# Patient Record
Sex: FEMALE | Race: WHITE | NOT HISPANIC OR LATINO | Employment: FULL TIME | URBAN - METROPOLITAN AREA
[De-identification: names, ages, dates, MRNs, and addresses within clinical notes are randomized per-mention and may not be internally consistent; named-entity substitution may affect disease eponyms.]

---

## 2019-03-12 ENCOUNTER — OFFICE VISIT (OUTPATIENT)
Dept: GASTROENTEROLOGY | Facility: CLINIC | Age: 59
End: 2019-03-12

## 2019-03-12 VITALS — HEIGHT: 64 IN | WEIGHT: 170 LBS | BODY MASS INDEX: 29.02 KG/M2

## 2019-03-12 DIAGNOSIS — Z83.71 FHX: COLONIC POLYPS: Primary | ICD-10-CM

## 2019-03-12 RX ORDER — LEVOTHYROXINE SODIUM 0.03 MG/1
25 TABLET ORAL DAILY
COMMUNITY

## 2019-03-12 NOTE — PROGRESS NOTES
Pt seen for colon prep; meds reviewed; suprep instructions given and reivewed; rx forwarded to provider for approval; screening form signed

## 2022-09-19 ENCOUNTER — TELEPHONE (OUTPATIENT)
Dept: GASTROENTEROLOGY | Facility: CLINIC | Age: 62
End: 2022-09-19

## 2022-09-19 ENCOUNTER — OFFICE VISIT (OUTPATIENT)
Dept: GASTROENTEROLOGY | Facility: CLINIC | Age: 62
End: 2022-09-19
Payer: COMMERCIAL

## 2022-09-19 VITALS
HEART RATE: 73 BPM | BODY MASS INDEX: 36.19 KG/M2 | SYSTOLIC BLOOD PRESSURE: 124 MMHG | HEIGHT: 64 IN | WEIGHT: 212 LBS | DIASTOLIC BLOOD PRESSURE: 78 MMHG

## 2022-09-19 DIAGNOSIS — R11.14 BILIOUS VOMITING WITH NAUSEA: Primary | ICD-10-CM

## 2022-09-19 DIAGNOSIS — K59.01 SLOW TRANSIT CONSTIPATION: ICD-10-CM

## 2022-09-19 DIAGNOSIS — Z83.71 FAMILY HISTORY OF COLONIC POLYPS: ICD-10-CM

## 2022-09-19 PROBLEM — Z99.89 OSA ON CPAP: Status: ACTIVE | Noted: 2022-09-19

## 2022-09-19 PROBLEM — E66.9 OBESITY (BMI 35.0-39.9 WITHOUT COMORBIDITY): Status: ACTIVE | Noted: 2022-09-19

## 2022-09-19 PROBLEM — G47.33 OSA ON CPAP: Status: ACTIVE | Noted: 2022-09-19

## 2022-09-19 PROCEDURE — 99204 OFFICE O/P NEW MOD 45 MIN: CPT | Performed by: INTERNAL MEDICINE

## 2022-09-19 RX ORDER — MIRABEGRON 25 MG/1
25 TABLET, FILM COATED, EXTENDED RELEASE ORAL DAILY
COMMUNITY
Start: 2022-08-29

## 2022-09-19 RX ORDER — FAMOTIDINE 20 MG/1
20 TABLET, FILM COATED ORAL 2 TIMES DAILY PRN
Qty: 60 TABLET | Refills: 5 | Status: SHIPPED | OUTPATIENT
Start: 2022-09-19

## 2022-09-19 NOTE — PROGRESS NOTES
[id number] - Outpatient Consultation  Eladio Gomez 63 y o  female MRN: [id number]  Encounter: [id number]    ASSESSMENT AND PLAN:      1  Bilious vomiting with nausea  This is a 61F referred to us by Dr Mila Murphy MD for intermittent episodes of nausea and vomiting and heartburn  Sounds like reflux triggered by over eating, recent 20+lb weight gain, and seasonal allergies  - GERD lifestyle modifications and weight loss discussed  - Pepcid prn ok until EGD    - Schedule EGD @ [address]   - famotidine (PEPCID) 20 mg tablet; Take 1 tablet (20 mg total) by mouth 2 (two) times a day as needed for heartburn  Dispense: 60 tablet; Refill: 5    2  Slow transit constipation  Well controlled benefiber daily  Increase water intake  3  Family history of colonic polyps  Brother w polyps  Last colon neg in 2019, recall 3/2024  Followup Appointment: 3 months  ______________________________________________________________________    Chief Complaint   Patient presents with    Indigestion, vomiting, change in bowels on and off     Referred by Dr Goldie Quinones         HPI:   Eladio Gomez is a 63y o  year old female who presents today at the request of her PCP for n/v/heartburn  Pt states she gets these intermittent episodes of symptoms  Started about 2 years ago  Has gained about 25lbs in the past 3 years  Episodes are sporadic  Can last several hours  Worse in the evenings after her largest meal of the day  Epigastric burning and post prandial nausea and regurgitating of bile or sometimes food  Worse a large meals  No f/c  Worse during allergy seasons when she is congested  Tried allergy meds       Historical Information   Past Medical History:   Diagnosis Date    Allergic rhinitis     Colon polyp     Dermatofibroma 2009    Hypertonicity of bladder     Hypothyroidism     Knee pain, right     Obesity (BMI 35 0-39 9 without comorbidity)     BRUCE on CPAP      Past Surgical History:   Procedure Laterality Date    CHOLECYSTECTOMY      COLONOSCOPY      LEG SURGERY Right     plates and screws     Social History     Substance and Sexual Activity   Alcohol Use Yes    Comment: occasional     Social History     Substance and Sexual Activity   Drug Use Not on file     Social History     Tobacco Use   Smoking Status Never Smoker   Smokeless Tobacco Never Used     Family History   Problem Relation Age of Onset    Cancer Mother     Stomach cancer Sister     Colon polyps Brother     Cancer Brother     Colon cancer Neg Hx        Meds/Allergies     Current Outpatient Medications:     famotidine (PEPCID) 20 mg tablet    levothyroxine 25 mcg tablet    Myrbetriq 25 MG TB24    Wheat Dextrin (BENEFIBER PO)    No Known Allergies    PHYSICAL EXAM:    Blood pressure 124/78, pulse 73, height 5' 4" (1 626 m), weight 96 2 kg (212 lb)  Body mass index is 36 39 kg/m²  General Appearance: NAD, cooperative, alert  Eyes: Anicteric, PERRLA, EOMI  ENT:  Normocephalic, atraumatic, normal mucosa  Neck:  Supple, symmetrical, trachea midline,   Resp:  Clear to auscultation bilaterally; no rales, rhonchi or wheezing; respirations unlabored   CV:  S1 S2, Regular rate and rhythm; no murmur, rub, or gallop  GI:  Soft, non-tender, non-distended; normal bowel sounds; no masses, no organomegaly   Rectal: Deferred  Musculoskeletal: No cyanosis, clubbing or edema  Normal ROM  Skin:  No jaundice, rashes, or lesions   Heme/Lymph: No palpable cervical lymphadenopathy  Psych: Normal affect, good eye contact  Neuro: No gross deficits, AAOx3    Lab Results:   Labs from 5/2022 reviewed  Normal CBC and CMP  REVIEW OF SYSTEMS:    CONSTITUTIONAL: Denies any fever, chills, rigors, and weight loss  HEENT: No earache or tinnitus  Denies hearing loss or visual disturbances  CARDIOVASCULAR: No chest pain or palpitations     RESPIRATORY: Denies any cough, hemoptysis, shortness of breath or dyspnea on exertion  GASTROINTESTINAL: As noted in the History of Present Illness  GENITOURINARY: No problems with urination  Denies any hematuria or dysuria  NEUROLOGIC: No dizziness or vertigo, denies headaches  MUSCULOSKELETAL: Denies any muscle or joint pain  SKIN: Denies skin rashes or itching  ENDOCRINE: Denies excessive thirst  Denies intolerance to heat or cold  PSYCHOSOCIAL: Denies depression or anxiety  Denies any recent memory loss

## 2022-09-19 NOTE — TELEPHONE ENCOUNTER
Scheduled date of EGD(as of today):09/26/2022  Physician performing EGD:Dr Halle Bates  Location of EGD:[facility]  Instructions reviewed with patient by:Cindy Marks  Clearances: no

## 2022-09-19 NOTE — TELEPHONE ENCOUNTER
Patient was seen on 09/19/2022 and an EGD was ordered  She is scheduled on 09/26/2022, with Dr Aniyah Panda, at the [facility]

## 2022-09-19 NOTE — H&P (VIEW-ONLY)
[id number] - Outpatient Consultation  Josue Conley 63 y o  female MRN: [id number]  Encounter: [id number]    ASSESSMENT AND PLAN:      1  Bilious vomiting with nausea  This is a 61F referred to us by Dr Corin Hewitt MD for intermittent episodes of nausea and vomiting and heartburn  Sounds like reflux triggered by over eating, recent 20+lb weight gain, and seasonal allergies  - GERD lifestyle modifications and weight loss discussed  - Pepcid prn ok until EGD    - Schedule EGD @ [address]   - famotidine (PEPCID) 20 mg tablet; Take 1 tablet (20 mg total) by mouth 2 (two) times a day as needed for heartburn  Dispense: 60 tablet; Refill: 5    2  Slow transit constipation  Well controlled benefiber daily  Increase water intake  3  Family history of colonic polyps  Brother w polyps  Last colon neg in 2019, recall 3/2024  Followup Appointment: 3 months  ______________________________________________________________________    Chief Complaint   Patient presents with    Indigestion, vomiting, change in bowels on and off     Referred by Dr Teagan Mahoney         HPI:   Josue Conley is a 63y o  year old female who presents today at the request of her PCP for n/v/heartburn  Pt states she gets these intermittent episodes of symptoms  Started about 2 years ago  Has gained about 25lbs in the past 3 years  Episodes are sporadic  Can last several hours  Worse in the evenings after her largest meal of the day  Epigastric burning and post prandial nausea and regurgitating of bile or sometimes food  Worse a large meals  No f/c  Worse during allergy seasons when she is congested  Tried allergy meds       Historical Information   Past Medical History:   Diagnosis Date    Allergic rhinitis     Colon polyp     Dermatofibroma 2009    Hypertonicity of bladder     Hypothyroidism     Knee pain, right     Obesity (BMI 35 0-39 9 without comorbidity)     BRUCE on CPAP      Past Surgical History:   Procedure Laterality Date    CHOLECYSTECTOMY      COLONOSCOPY      LEG SURGERY Right     plates and screws     Social History     Substance and Sexual Activity   Alcohol Use Yes    Comment: occasional     Social History     Substance and Sexual Activity   Drug Use Not on file     Social History     Tobacco Use   Smoking Status Never Smoker   Smokeless Tobacco Never Used     Family History   Problem Relation Age of Onset    Cancer Mother     Stomach cancer Sister     Colon polyps Brother     Cancer Brother     Colon cancer Neg Hx        Meds/Allergies     Current Outpatient Medications:     famotidine (PEPCID) 20 mg tablet    levothyroxine 25 mcg tablet    Myrbetriq 25 MG TB24    Wheat Dextrin (BENEFIBER PO)    No Known Allergies    PHYSICAL EXAM:    Blood pressure 124/78, pulse 73, height 5' 4" (1 626 m), weight 96 2 kg (212 lb)  Body mass index is 36 39 kg/m²  General Appearance: NAD, cooperative, alert  Eyes: Anicteric, PERRLA, EOMI  ENT:  Normocephalic, atraumatic, normal mucosa  Neck:  Supple, symmetrical, trachea midline,   Resp:  Clear to auscultation bilaterally; no rales, rhonchi or wheezing; respirations unlabored   CV:  S1 S2, Regular rate and rhythm; no murmur, rub, or gallop  GI:  Soft, non-tender, non-distended; normal bowel sounds; no masses, no organomegaly   Rectal: Deferred  Musculoskeletal: No cyanosis, clubbing or edema  Normal ROM  Skin:  No jaundice, rashes, or lesions   Heme/Lymph: No palpable cervical lymphadenopathy  Psych: Normal affect, good eye contact  Neuro: No gross deficits, AAOx3    Lab Results:   Labs from 5/2022 reviewed  Normal CBC and CMP  REVIEW OF SYSTEMS:    CONSTITUTIONAL: Denies any fever, chills, rigors, and weight loss  HEENT: No earache or tinnitus  Denies hearing loss or visual disturbances  CARDIOVASCULAR: No chest pain or palpitations     RESPIRATORY: Denies any cough, hemoptysis, shortness of breath or dyspnea on exertion  GASTROINTESTINAL: As noted in the History of Present Illness  GENITOURINARY: No problems with urination  Denies any hematuria or dysuria  NEUROLOGIC: No dizziness or vertigo, denies headaches  MUSCULOSKELETAL: Denies any muscle or joint pain  SKIN: Denies skin rashes or itching  ENDOCRINE: Denies excessive thirst  Denies intolerance to heat or cold  PSYCHOSOCIAL: Denies depression or anxiety  Denies any recent memory loss

## 2022-09-26 ENCOUNTER — HOSPITAL ENCOUNTER (OUTPATIENT)
Dept: GASTROENTEROLOGY | Facility: AMBULATORY SURGERY CENTER | Age: 62
Discharge: HOME | End: 2022-09-26
Attending: INTERNAL MEDICINE
Payer: COMMERCIAL

## 2022-09-26 ENCOUNTER — ANESTHESIA (OUTPATIENT)
Dept: GASTROENTEROLOGY | Facility: AMBULATORY SURGERY CENTER | Age: 62
End: 2022-09-26

## 2022-09-26 ENCOUNTER — ANESTHESIA EVENT (OUTPATIENT)
Dept: GASTROENTEROLOGY | Facility: AMBULATORY SURGERY CENTER | Age: 62
End: 2022-09-26

## 2022-09-26 VITALS
OXYGEN SATURATION: 98 % | WEIGHT: 215 LBS | SYSTOLIC BLOOD PRESSURE: 136 MMHG | TEMPERATURE: 98.3 F | HEART RATE: 66 BPM | DIASTOLIC BLOOD PRESSURE: 69 MMHG | HEIGHT: 64 IN | RESPIRATION RATE: 19 BRPM | BODY MASS INDEX: 36.7 KG/M2

## 2022-09-26 DIAGNOSIS — R11.14 BILIOUS VOMITING WITH NAUSEA: ICD-10-CM

## 2022-09-26 DIAGNOSIS — R11.2 NAUSEA AND VOMITING, UNSPECIFIED VOMITING TYPE: Primary | ICD-10-CM

## 2022-09-26 PROCEDURE — 43239 EGD BIOPSY SINGLE/MULTIPLE: CPT | Performed by: INTERNAL MEDICINE

## 2022-09-26 PROCEDURE — 88305 TISSUE EXAM BY PATHOLOGIST: CPT | Performed by: PATHOLOGY

## 2022-09-26 RX ORDER — ONDANSETRON 8 MG/1
8 TABLET, ORALLY DISINTEGRATING ORAL EVERY 12 HOURS PRN
Qty: 30 TABLET | Refills: 1 | Status: SHIPPED | OUTPATIENT
Start: 2022-09-26

## 2022-09-26 RX ORDER — LIDOCAINE HYDROCHLORIDE 20 MG/ML
INJECTION, SOLUTION EPIDURAL; INFILTRATION; INTRACAUDAL; PERINEURAL AS NEEDED
Status: DISCONTINUED | OUTPATIENT
Start: 2022-09-26 | End: 2022-09-26

## 2022-09-26 RX ORDER — SODIUM CHLORIDE, SODIUM LACTATE, POTASSIUM CHLORIDE, CALCIUM CHLORIDE 600; 310; 30; 20 MG/100ML; MG/100ML; MG/100ML; MG/100ML
INJECTION, SOLUTION INTRAVENOUS CONTINUOUS PRN
Status: DISCONTINUED | OUTPATIENT
Start: 2022-09-26 | End: 2022-09-26

## 2022-09-26 RX ORDER — SODIUM CHLORIDE, SODIUM LACTATE, POTASSIUM CHLORIDE, CALCIUM CHLORIDE 600; 310; 30; 20 MG/100ML; MG/100ML; MG/100ML; MG/100ML
50 INJECTION, SOLUTION INTRAVENOUS CONTINUOUS
Status: DISCONTINUED | OUTPATIENT
Start: 2022-09-26 | End: 2022-09-30 | Stop reason: HOSPADM

## 2022-09-26 RX ORDER — PROPOFOL 10 MG/ML
INJECTION, EMULSION INTRAVENOUS AS NEEDED
Status: DISCONTINUED | OUTPATIENT
Start: 2022-09-26 | End: 2022-09-26

## 2022-09-26 RX ORDER — LEVOTHYROXINE SODIUM 150 UG/1
150 TABLET ORAL DAILY
COMMUNITY
Start: 2022-08-29

## 2022-09-26 RX ADMIN — SODIUM CHLORIDE, SODIUM LACTATE, POTASSIUM CHLORIDE, CALCIUM CHLORIDE 50 ML/HR: 600; 310; 30; 20 INJECTION, SOLUTION INTRAVENOUS at 13:28

## 2022-09-26 RX ADMIN — PROPOFOL 200 MG: 10 INJECTION, EMULSION INTRAVENOUS at 13:42

## 2022-09-26 RX ADMIN — SODIUM CHLORIDE, SODIUM LACTATE, POTASSIUM CHLORIDE, CALCIUM CHLORIDE: 600; 310; 30; 20 INJECTION, SOLUTION INTRAVENOUS at 13:33

## 2022-09-26 RX ADMIN — PROPOFOL 100 MG: 10 INJECTION, EMULSION INTRAVENOUS at 13:46

## 2022-09-26 RX ADMIN — LIDOCAINE HYDROCHLORIDE 100 MG: 20 INJECTION, SOLUTION EPIDURAL; INFILTRATION; INTRACAUDAL; PERINEURAL at 13:42

## 2022-09-26 RX ADMIN — PROPOFOL 100 MG: 10 INJECTION, EMULSION INTRAVENOUS at 13:50

## 2022-09-26 NOTE — ANESTHESIA PREPROCEDURE EVALUATION
Procedure:  EGD    Relevant Problems   ANESTHESIA (within normal limits)      ENDO   (+) Hypothyroidism      PULMONARY   (+) BRUCE on CPAP        Physical Exam    Airway    Mallampati score: I  TM Distance: >3 FB  Neck ROM: full     Dental   No notable dental hx     Cardiovascular  Cardiovascular exam normal    Pulmonary  Pulmonary exam normal     Other Findings        Anesthesia Plan  ASA Score- 2     Anesthesia Type- IV sedation with anesthesia with ASA Monitors  Additional Monitors:   Airway Plan:     Comment: I discussed risks (reviewed with patient on the anesthesia consent form), benefits and alternatives of monitored sedation including the possibility under sedation to have recall or mild discomfort          Plan Factors-    Chart reviewed  Patient summary reviewed  Induction- intravenous  Postoperative Plan-     Informed Consent- Anesthetic plan and risks discussed with patient  I personally reviewed this patient with the CRNA  Discussed and agreed on the Anesthesia Plan with the CRNA  Ronak Lewis

## 2022-09-26 NOTE — ANESTHESIA POSTPROCEDURE EVALUATION
Post-Op Assessment Note    CV Status:  Stable  Pain Score: 0    Pain management: adequate     Mental Status:  Arousable and sleepy   Hydration Status:  Stable   PONV Controlled:  Controlled   Airway Patency:  Patent      Post Op Vitals Reviewed: Yes      Staff: CRNA         No complications documented      BP  103/58   Temp 97 6   Pulse 60   Resp 14   SpO2 99

## 2022-09-26 NOTE — INTERVAL H&P NOTE
H&P reviewed  After examining the patient I find no changes in the patients condition since the H&P had been written      Vitals:    09/26/22 1322   BP: (!) 183/89   Pulse: 71   Resp: (!) 24   Temp:    SpO2: 99%

## 2022-10-03 PROCEDURE — 88305 TISSUE EXAM BY PATHOLOGIST: CPT | Performed by: PATHOLOGY

## 2023-07-14 DIAGNOSIS — R11.14 BILIOUS VOMITING WITH NAUSEA: ICD-10-CM

## 2023-07-14 RX ORDER — FAMOTIDINE 20 MG/1
20 TABLET, FILM COATED ORAL 2 TIMES DAILY PRN
Qty: 60 TABLET | Refills: 1 | Status: SHIPPED | OUTPATIENT
Start: 2023-07-14

## 2023-07-14 NOTE — TELEPHONE ENCOUNTER
Medication Famotidine    Quantity  60     Pharmacy [facility]    PCP/Speciality Specialty    Enough for 3 days -Yes

## 2023-09-26 ENCOUNTER — OFFICE VISIT (OUTPATIENT)
Dept: GASTROENTEROLOGY | Facility: CLINIC | Age: 63
End: 2023-09-26
Payer: COMMERCIAL

## 2023-09-26 VITALS
WEIGHT: 226 LBS | SYSTOLIC BLOOD PRESSURE: 138 MMHG | HEIGHT: 64 IN | DIASTOLIC BLOOD PRESSURE: 90 MMHG | BODY MASS INDEX: 38.58 KG/M2

## 2023-09-26 DIAGNOSIS — K59.01 SLOW TRANSIT CONSTIPATION: ICD-10-CM

## 2023-09-26 DIAGNOSIS — K21.00 GASTROESOPHAGEAL REFLUX DISEASE WITH ESOPHAGITIS WITHOUT HEMORRHAGE: Primary | ICD-10-CM

## 2023-09-26 DIAGNOSIS — Z83.719 FAMILY HISTORY OF COLONIC POLYPS: ICD-10-CM

## 2023-09-26 DIAGNOSIS — R11.2 NAUSEA AND VOMITING, UNSPECIFIED VOMITING TYPE: ICD-10-CM

## 2023-09-26 PROCEDURE — 99214 OFFICE O/P EST MOD 30 MIN: CPT | Performed by: INTERNAL MEDICINE

## 2023-09-26 RX ORDER — FAMOTIDINE 20 MG/1
20 TABLET, FILM COATED ORAL DAILY
Qty: 90 TABLET | Refills: 0 | Status: SHIPPED | OUTPATIENT
Start: 2023-09-26

## 2023-09-26 RX ORDER — POLYETHYLENE GLYCOL 3350, SODIUM SULFATE ANHYDROUS, SODIUM BICARBONATE, SODIUM CHLORIDE, POTASSIUM CHLORIDE 236; 22.74; 6.74; 5.86; 2.97 G/4L; G/4L; G/4L; G/4L; G/4L
4000 POWDER, FOR SOLUTION ORAL ONCE
Qty: 4000 ML | Refills: 0 | Status: SHIPPED | OUTPATIENT
Start: 2023-09-26 | End: 2023-09-26

## 2023-09-26 NOTE — PROGRESS NOTES
[facility] - Outpatient Follow-up Note  Rosanna Daniels 63 y.o. female MRN: [id number]  Encounter: [id number]    ASSESSMENT AND PLAN:      1. Gastroesophageal reflux disease with esophagitis without hemorrhage  62F here today for f/u. EGD last year showing gastritis and esophagitis. Pepcid 20 qAM is enough and watching diet. - Cont weight loss and GERD lifestyle modifications  - Pt would like to consider stopping the Pepcid and see how her inflammation improves. - famotidine (PEPCID) 20 mg tablet; Take 1 tablet (20 mg total) by mouth daily  Dispense: 90 tablet; Refill: 0    - CBC and differential  - Comprehensive metabolic panel  - EGD; Future    2. Nausea and vomiting, unspecified vomiting type    3. Slow transit constipation  improved    4. Family history of colonic polyps  Due 3/2024  - Colonoscopy; Future  - polyethylene glycol (Golytely) 4000 mL solution; Take 4,000 mL by mouth once for 1 dose Take 4000 mL by mouth once for 1 dose. Use as directed  Dispense: 4000 mL; Refill: 0    5. BMI 38.0-38.9,adult  Discussed various options of weight loss       Followup Appointment: 1 year  ______________________________________________________________________    Chief Complaint   Patient presents with   • Yearly follow up from EGD     HPI:  62F here today for f/u. Overall doing well on pepcid and watching what she eats. Gained 10lbs since last year. Previously did protein shakes/bars to help lose weight but feels she gains it back when she stops. Went on vacation in 6/2023 and had one episode of n/v when she overate.     Historical Information   Past Medical History:   Diagnosis Date   • Allergic rhinitis    • Colon polyp    • Dermatofibroma 2009   • Hypertonicity of bladder    • Hypothyroidism    • Knee pain, right    • Obesity (BMI 35.0-39.9 without comorbidity)    • BRUCE on CPAP      Past Surgical History:   Procedure Laterality Date   • CHOLECYSTECTOMY     • COLONOSCOPY • LEG SURGERY Right     plates and screws     Social History     Substance and Sexual Activity   Alcohol Use Yes    Comment: occasional     Social History     Substance and Sexual Activity   Drug Use Never     Social History     Tobacco Use   Smoking Status Never   Smokeless Tobacco Never     Family History   Problem Relation Age of Onset   • Cancer Mother    • Stomach cancer Sister    • Colon polyps Brother    • Cancer Brother    • Cancer Sister    • Colon cancer Neg Hx          Current Outpatient Medications:   •  Euthyrox 150 MCG tablet  •  famotidine (PEPCID) 20 mg tablet  •  Fexofenadine HCl (ALLEGRA PO)  •  Myrbetriq 25 MG TB24  •  ondansetron (Zofran ODT) 8 mg disintegrating tablet  •  polyethylene glycol (Golytely) 4000 mL solution  •  Wheat Dextrin (BENEFIBER PO)  No Known Allergies  Reviewed medications and allergies and updated as indicated    PHYSICAL EXAM:    Blood pressure 138/90, height 5' 4" (1.626 m), weight 103 kg (226 lb). Body mass index is 38.79 kg/m². General Appearance: NAD, cooperative, alert  Eyes: Anicteric, PERRLA, EOMI  ENT:  Normocephalic, atraumatic, normal mucosa. Neck:  Supple, symmetrical, trachea midline  Resp:  Clear to auscultation bilaterally; no rales, rhonchi or wheezing; respirations unlabored   CV:  S1 S2, Regular rate and rhythm; no murmur, rub, or gallop. GI:  Soft, non-tender, non-distended; normal bowel sounds; no masses, no organomegaly   Rectal: Deferred  Musculoskeletal: No cyanosis, clubbing or edema. Normal ROM.   Skin:  No jaundice, rashes, or lesions   Heme/Lymph: No palpable cervical lymphadenopathy  Psych: Normal affect, good eye contact  Neuro: No gross deficits, AAOx3    Lab Results:   No results found for: "WBC", "HGB", "HCT", "MCV", "PLT"  No results found for: "NA", "K", "CL", "CO2", "ANIONGAP", "BUN", "CREATININE", "GLUCOSE", "GLUF", "CALCIUM", "CORRECTEDCA", "AST", "ALT", "ALKPHOS", "PROT", "BILITOT", "EGFR"  No results found for: "IRON", "TIBC", "FERRITIN"  No results found for: "LIPASE"    Radiology Results:   No results found.

## 2023-12-19 DIAGNOSIS — K21.00 GASTROESOPHAGEAL REFLUX DISEASE WITH ESOPHAGITIS WITHOUT HEMORRHAGE: ICD-10-CM

## 2023-12-19 RX ORDER — FAMOTIDINE 20 MG/1
20 TABLET, FILM COATED ORAL DAILY
Qty: 90 TABLET | Refills: 1 | Status: SHIPPED | OUTPATIENT
Start: 2023-12-19

## 2024-03-04 ENCOUNTER — TELEPHONE (OUTPATIENT)
Age: 64
End: 2024-03-04

## 2024-03-04 DIAGNOSIS — Z83.719 FAMILY HISTORY OF COLONIC POLYPS: ICD-10-CM

## 2024-03-04 RX ORDER — POLYETHYLENE GLYCOL 3350, SODIUM SULFATE ANHYDROUS, SODIUM BICARBONATE, SODIUM CHLORIDE, POTASSIUM CHLORIDE 236; 22.74; 6.74; 5.86; 2.97 G/4L; G/4L; G/4L; G/4L; G/4L
4000 POWDER, FOR SOLUTION ORAL ONCE
Qty: 4000 ML | Refills: 0 | Status: SHIPPED | OUTPATIENT
Start: 2024-03-04 | End: 2024-03-04

## 2024-03-04 NOTE — TELEPHONE ENCOUNTER
Voicemail left for patient no meds need to be stopped prior to colonoscopy per Dr. Johnson.  Also advised golytely rx would be sent this week.  Dr. Johnson had sent golytely rx.

## 2024-03-04 NOTE — TELEPHONE ENCOUNTER
03/04/24  Screened by: Cheryl Deluna    Referring Provider Alex    Pre- Screening:     There is no height or weight on file to calculate BMI.  Has patient been referred for a routine screening Colonoscopy? yes  Is the patient between 45-75 years old? yes      Previous Colonoscopy no   If yes:    Date:     Facility:     Reason:     Does the patient want to see a Gastroenterologist prior to their procedure OR are they having any GI symptoms? no    Has the patient been hospitalized or had abdominal surgery in the past 6 months? no    Does the patient use supplemental oxygen? no    Does the patient take Coumadin, Lovenox, Plavix, Elliquis, Xarelto, or other blood thinning medication? no    Has the patient had a stroke, cardiac event, or stent placed in the past year? no

## 2024-03-04 NOTE — TELEPHONE ENCOUNTER
Scheduled date of colonoscopy/EGD (as of today): 5/8/24  Physician performing colonoscopy: Alex  Location of colonoscopy: [facility]  Bowel prep reviewed with patient: Golytely  Instructions reviewed with patient by: Mail - request sent to    Clearances: Patient uses CPAP

## 2024-04-24 ENCOUNTER — ANESTHESIA EVENT (OUTPATIENT)
Dept: ANESTHESIOLOGY | Facility: AMBULATORY SURGERY CENTER | Age: 64
End: 2024-04-24

## 2024-04-24 ENCOUNTER — ANESTHESIA (OUTPATIENT)
Dept: ANESTHESIOLOGY | Facility: AMBULATORY SURGERY CENTER | Age: 64
End: 2024-04-24

## 2024-04-29 ENCOUNTER — TELEPHONE (OUTPATIENT)
Dept: GASTROENTEROLOGY | Facility: CLINIC | Age: 64
End: 2024-04-29

## 2024-04-29 NOTE — TELEPHONE ENCOUNTER
Procedure confirmed  Colonoscopy/Endoscopy     Via: Voice mail    Instructions given: Given to Patient at Visit     Prep Given: Golytely    Call the office if there are any questions.

## 2024-04-29 NOTE — TELEPHONE ENCOUNTER
Pt called to discuss some concerns some medical concerns she is having that may interfere with colonoscopy. Pt has apt with pcp today, she will call back tomorrow and let us know if she is going to r/s colonoscopy or not.

## 2024-04-29 NOTE — TELEPHONE ENCOUNTER
Scheduled date of colonoscopy (as of today):6/18/24  Physician performing colonoscopy:HALEY  Location of colonoscopy:  Bowel prep reviewed with patient:  Instructions reviewed with patient by:  Clearances:

## 2024-06-13 DIAGNOSIS — K21.00 GASTROESOPHAGEAL REFLUX DISEASE WITH ESOPHAGITIS WITHOUT HEMORRHAGE: ICD-10-CM

## 2024-06-13 RX ORDER — FAMOTIDINE 20 MG/1
20 TABLET, FILM COATED ORAL DAILY
Qty: 90 TABLET | Refills: 1 | Status: SHIPPED | OUTPATIENT
Start: 2024-06-13

## 2024-07-16 ENCOUNTER — ANESTHESIA EVENT (OUTPATIENT)
Dept: ANESTHESIOLOGY | Facility: AMBULATORY SURGERY CENTER | Age: 64
End: 2024-07-16

## 2024-07-16 ENCOUNTER — ANESTHESIA (OUTPATIENT)
Dept: ANESTHESIOLOGY | Facility: AMBULATORY SURGERY CENTER | Age: 64
End: 2024-07-16

## 2024-08-16 DIAGNOSIS — K21.00 GASTROESOPHAGEAL REFLUX DISEASE WITH ESOPHAGITIS WITHOUT HEMORRHAGE: ICD-10-CM

## 2024-08-16 RX ORDER — FAMOTIDINE 20 MG/1
20 TABLET, FILM COATED ORAL DAILY
Qty: 100 TABLET | Refills: 1 | Status: SHIPPED | OUTPATIENT
Start: 2024-08-16

## 2024-08-16 NOTE — TELEPHONE ENCOUNTER
Reason for call:   [x] Refill   [] Prior Auth  [] Other:     Office:   [] PCP/Provider -   [] Specialty/Provider -     Medication: famotidine    Dose/Frequency: 20 mg take one tablet daily    Quantity: 90    Pharmacy: [facility] [address]    Does the patient have enough for 3 days?   [] Yes   [x] No - Send as HP to POD- pt has been out of this medication, needs new script sent as she does not use the [facility] pharmacy

## 2024-08-23 ENCOUNTER — ANESTHESIA EVENT (OUTPATIENT)
Dept: ANESTHESIOLOGY | Facility: AMBULATORY SURGERY CENTER | Age: 64
End: 2024-08-23

## 2024-08-23 ENCOUNTER — ANESTHESIA (OUTPATIENT)
Dept: ANESTHESIOLOGY | Facility: AMBULATORY SURGERY CENTER | Age: 64
End: 2024-08-23

## 2024-08-29 ENCOUNTER — TELEPHONE (OUTPATIENT)
Dept: GASTROENTEROLOGY | Facility: CLINIC | Age: 64
End: 2024-08-29

## 2024-09-05 ENCOUNTER — PREP FOR PROCEDURE (OUTPATIENT)
Dept: GASTROENTEROLOGY | Facility: CLINIC | Age: 64
End: 2024-09-05

## 2024-09-06 ENCOUNTER — HOSPITAL ENCOUNTER (OUTPATIENT)
Dept: GASTROENTEROLOGY | Facility: AMBULATORY SURGERY CENTER | Age: 64
Discharge: HOME | End: 2024-09-06
Attending: INTERNAL MEDICINE
Payer: COMMERCIAL

## 2024-09-06 ENCOUNTER — ANESTHESIA EVENT (OUTPATIENT)
Dept: GASTROENTEROLOGY | Facility: AMBULATORY SURGERY CENTER | Age: 64
End: 2024-09-06

## 2024-09-06 ENCOUNTER — ANESTHESIA (OUTPATIENT)
Dept: GASTROENTEROLOGY | Facility: AMBULATORY SURGERY CENTER | Age: 64
End: 2024-09-06

## 2024-09-06 VITALS
BODY MASS INDEX: 38.41 KG/M2 | WEIGHT: 225 LBS | RESPIRATION RATE: 20 BRPM | HEIGHT: 64 IN | HEART RATE: 65 BPM | TEMPERATURE: 97.4 F | DIASTOLIC BLOOD PRESSURE: 77 MMHG | SYSTOLIC BLOOD PRESSURE: 139 MMHG | OXYGEN SATURATION: 100 %

## 2024-09-06 DIAGNOSIS — Z83.719 FAMILY HISTORY OF COLONIC POLYPS: ICD-10-CM

## 2024-09-06 DIAGNOSIS — K21.00 GASTROESOPHAGEAL REFLUX DISEASE WITH ESOPHAGITIS WITHOUT HEMORRHAGE: ICD-10-CM

## 2024-09-06 PROCEDURE — G0105 COLORECTAL SCRN; HI RISK IND: HCPCS | Performed by: INTERNAL MEDICINE

## 2024-09-06 PROCEDURE — 88305 TISSUE EXAM BY PATHOLOGIST: CPT | Performed by: PATHOLOGY

## 2024-09-06 PROCEDURE — 43239 EGD BIOPSY SINGLE/MULTIPLE: CPT | Performed by: INTERNAL MEDICINE

## 2024-09-06 RX ORDER — SODIUM CHLORIDE, SODIUM LACTATE, POTASSIUM CHLORIDE, CALCIUM CHLORIDE 600; 310; 30; 20 MG/100ML; MG/100ML; MG/100ML; MG/100ML
50 INJECTION, SOLUTION INTRAVENOUS CONTINUOUS
Status: DISCONTINUED | OUTPATIENT
Start: 2024-09-06 | End: 2024-09-10 | Stop reason: HOSPADM

## 2024-09-06 RX ORDER — PROPOFOL 10 MG/ML
INJECTION, EMULSION INTRAVENOUS AS NEEDED
Status: DISCONTINUED | OUTPATIENT
Start: 2024-09-06 | End: 2024-09-06

## 2024-09-06 RX ORDER — LIDOCAINE HYDROCHLORIDE 20 MG/ML
INJECTION, SOLUTION EPIDURAL; INFILTRATION; INTRACAUDAL; PERINEURAL AS NEEDED
Status: DISCONTINUED | OUTPATIENT
Start: 2024-09-06 | End: 2024-09-06

## 2024-09-06 RX ADMIN — PROPOFOL 50 MG: 10 INJECTION, EMULSION INTRAVENOUS at 08:05

## 2024-09-06 RX ADMIN — PROPOFOL 50 MG: 10 INJECTION, EMULSION INTRAVENOUS at 08:09

## 2024-09-06 RX ADMIN — SODIUM CHLORIDE, SODIUM LACTATE, POTASSIUM CHLORIDE, CALCIUM CHLORIDE: 600; 310; 30; 20 INJECTION, SOLUTION INTRAVENOUS at 07:47

## 2024-09-06 RX ADMIN — PROPOFOL 40 MG: 10 INJECTION, EMULSION INTRAVENOUS at 08:13

## 2024-09-06 RX ADMIN — PROPOFOL 40 MG: 10 INJECTION, EMULSION INTRAVENOUS at 08:22

## 2024-09-06 RX ADMIN — PROPOFOL 150 MG: 10 INJECTION, EMULSION INTRAVENOUS at 08:00

## 2024-09-06 RX ADMIN — LIDOCAINE HYDROCHLORIDE 50 MG: 20 INJECTION, SOLUTION EPIDURAL; INFILTRATION; INTRACAUDAL; PERINEURAL at 08:00

## 2024-09-06 RX ADMIN — PROPOFOL 50 MG: 10 INJECTION, EMULSION INTRAVENOUS at 08:17

## 2024-09-06 RX ADMIN — SODIUM CHLORIDE, SODIUM LACTATE, POTASSIUM CHLORIDE, CALCIUM CHLORIDE 50 ML/HR: 600; 310; 30; 20 INJECTION, SOLUTION INTRAVENOUS at 07:54

## 2024-09-06 NOTE — H&P
"History and Physical - [facility]    Elodia Nesbitt 63 y.o. female MRN: [id number]      HPI: Elodia Nesbitt is a 63 y.o. female who presents for reflux, fhx of colon polyps.    No Known Allergies      REVIEW OF SYSTEMS: Per the HPI, and otherwise unremarkable.    Historical Information     Past Medical History:   Diagnosis Date    Allergic rhinitis     Colon polyp     CPAP (continuous positive airway pressure) dependence     Dermatofibroma 2009    Disease of thyroid gland     Hypertension     Hypertonicity of bladder     Hypothyroidism     Knee pain, right     Obesity (BMI 35.0-39.9 without comorbidity)     BRUCE on CPAP      Past Surgical History:   Procedure Laterality Date    CHOLECYSTECTOMY      COLONOSCOPY      LEG SURGERY Right     plates and screws     Social History   Social History     Substance and Sexual Activity   Alcohol Use Yes    Comment: occasional     Social History     Substance and Sexual Activity   Drug Use Never     Social History     Tobacco Use   Smoking Status Never   Smokeless Tobacco Never     Family History   Problem Relation Age of Onset    Cancer Mother     Stomach cancer Sister     Colon polyps Brother     Cancer Brother     Cancer Sister     Colon cancer Neg Hx        Meds/Allergies       Current Outpatient Medications:     Euthyrox 150 MCG tablet    famotidine (PEPCID) 20 mg tablet    Fexofenadine HCl (ALLEGRA PO)    polyethylene glycol (Golytely) 4000 mL solution    Wheat Dextrin (BENEFIBER PO)    Myrbetriq 25 MG TB24    ondansetron (Zofran ODT) 8 mg disintegrating tablet    Current Facility-Administered Medications:     lactated ringers infusion, 50 mL/hr, Intravenous, Continuous, New Bag at 09/06/24 0747        Objective     BP (!) 187/104   Pulse 88   Temp (!) 97.4 °F (36.3 °C) (Temporal)   Resp 18   Ht 5' 4\" (1.626 m)   Wt 102 kg (225 lb)   SpO2 96%   BMI 38.62 kg/m²       PHYSICAL EXAM    Gen: NAD AAOx3  Head: Normocephalic, " Atraumatic  CV: S1S2 RRR no m/r/g  CHEST: Clear b/l no c/r/w  ABD: soft, +BS NT/ND no masses  EXT: no edema      ASSESSMENT/PLAN:  This is a 63 y.o. year old female here for egd/colon, and she is stable and optimized for her procedure.

## 2024-09-06 NOTE — ANESTHESIA POSTPROCEDURE EVALUATION
Post-Op Assessment Note    CV Status:  Stable    Pain management: adequate       Mental Status:  Alert and awake   Hydration Status:  Euvolemic   PONV Controlled:  Controlled   Airway Patency:  Patent     Post Op Vitals Reviewed: Yes    No anethesia notable event occurred.    Staff: CRNA               BP   106/71   Temp      Pulse  65   Resp   16   SpO2   99

## 2024-09-06 NOTE — ANESTHESIA PREPROCEDURE EVALUATION
Procedure:  COLONOSCOPY  EGD    Relevant Problems   CARDIO   (+) Hypertension      ENDO   (+) Hypothyroidism      GI/HEPATIC   (+) Gastroesophageal reflux disease with esophagitis without hemorrhage      PULMONARY   (+) BRUCE on CPAP      Dermatology   (+) Dermatofibroma      Other   (+) Obesity (BMI 35.0-39.9 without comorbidity)        Physical Exam    Airway    Mallampati score: III  TM Distance: >3 FB  Neck ROM: full     Dental   No notable dental hx     Cardiovascular      Pulmonary      Other Findings  post-pubertal.      Anesthesia Plan  ASA Score- 2     Anesthesia Type- IV sedation with anesthesia with ASA Monitors.         Additional Monitors:     Airway Plan:            Plan Factors-    Chart reviewed.    Patient summary reviewed.    Patient is not a current smoker.              Induction- intravenous.    Postoperative Plan-         Informed Consent- Anesthetic plan and risks discussed with patient.  I personally reviewed this patient with the CRNA. Discussed and agreed on the Anesthesia Plan with the CRNA..

## 2024-09-11 NOTE — RESULT ENCOUNTER NOTE
RECALL colon 5 years. No EGD recall.    Nursing please send pt message that her biopsy results from the EGD does show gastritis. I recommend increasing the pepcid to twice a day and follow up in the office in 6 weeks. Thank you.

## 2024-11-25 ENCOUNTER — OFFICE VISIT (OUTPATIENT)
Dept: GASTROENTEROLOGY | Facility: CLINIC | Age: 64
End: 2024-11-25
Payer: COMMERCIAL

## 2024-11-25 VITALS
BODY MASS INDEX: 39.03 KG/M2 | DIASTOLIC BLOOD PRESSURE: 70 MMHG | WEIGHT: 228.6 LBS | SYSTOLIC BLOOD PRESSURE: 104 MMHG | HEIGHT: 64 IN

## 2024-11-25 DIAGNOSIS — K21.00 GASTROESOPHAGEAL REFLUX DISEASE WITH ESOPHAGITIS WITHOUT HEMORRHAGE: Primary | ICD-10-CM

## 2024-11-25 DIAGNOSIS — K59.01 SLOW TRANSIT CONSTIPATION: ICD-10-CM

## 2024-11-25 DIAGNOSIS — Z83.719 FAMILY HISTORY OF COLONIC POLYPS: ICD-10-CM

## 2024-11-25 PROCEDURE — 99213 OFFICE O/P EST LOW 20 MIN: CPT | Performed by: INTERNAL MEDICINE

## 2024-11-25 RX ORDER — DICLOFENAC POTASSIUM 50 MG/1
POWDER, FOR SOLUTION ORAL
COMMUNITY
Start: 2024-05-16

## 2024-11-25 RX ORDER — DICLOFENAC POTASSIUM 50 MG/1
50 TABLET, FILM COATED ORAL
COMMUNITY
Start: 2024-10-14

## 2024-11-25 RX ORDER — CYCLOBENZAPRINE HCL 10 MG
10 TABLET ORAL EVERY 12 HOURS
COMMUNITY

## 2024-11-25 RX ORDER — LOSARTAN POTASSIUM AND HYDROCHLOROTHIAZIDE 12.5; 1 MG/1; MG/1
1 TABLET ORAL DAILY
COMMUNITY
Start: 2024-11-02

## 2024-11-25 RX ORDER — LOSARTAN POTASSIUM 100 MG/1
TABLET ORAL EVERY 24 HOURS
COMMUNITY

## 2024-11-25 RX ORDER — FAMOTIDINE 20 MG/1
20 TABLET, FILM COATED ORAL 2 TIMES DAILY
Qty: 60 TABLET | Refills: 5 | Status: SHIPPED | OUTPATIENT
Start: 2024-11-25

## 2024-11-25 RX ORDER — SOLIFENACIN SUCCINATE 10 MG/1
TABLET, FILM COATED ORAL
COMMUNITY
Start: 2024-06-20

## 2024-11-25 NOTE — ASSESSMENT & PLAN NOTE
EGD 9/2024 with gastritis, small hiatal hernia. On pepcid bid prn.     Gerd lifestyle modifications and weight loss. Pt would like to f/u w PCP for pepcid Rx so will f/u w GI as needed.     Orders:    famotidine (PEPCID) 20 mg tablet; Take 1 tablet (20 mg total) by mouth 2 (two) times a day

## 2024-11-25 NOTE — PROGRESS NOTES
Name: Elodia Nesbitt      : 1960      MRN: [id number]  Encounter Provider: Shanika Johnson MD  Encounter Date: 2024   Encounter department: [facility]  :  Assessment & Plan  Gastroesophageal reflux disease with esophagitis without hemorrhage  EGD 2024 with gastritis, small hiatal hernia. On pepcid bid prn.     Gerd lifestyle modifications and weight loss. Pt would like to f/u w PCP for pepcid Rx so will f/u w GI as needed.     Orders:    famotidine (PEPCID) 20 mg tablet; Take 1 tablet (20 mg total) by mouth 2 (two) times a day    Slow transit constipation  Resolved on benefiber qam.       BMI 38.0-38.9,adult         Family history of colonic polyps  Recall 2029             History of Present Illness       Elodia Nesbitt is a 64 y.o. female who presents today for f/u. Egd/colon results reviewed. Doing well on pepcid bid prn.    Avoiding triggering foods.     History obtained from: patient    Review of Systems   All other systems reviewed and are negative.    Past Medical History   Past Medical History:   Diagnosis Date    Allergic rhinitis     Colon polyp     CPAP (continuous positive airway pressure) dependence     Dermatofibroma 2009    Disease of thyroid gland     Hypertension     Hypertonicity of bladder     Hypothyroidism     Knee pain, right     Obesity (BMI 35.0-39.9 without comorbidity)     BRUCE on CPAP      Past Surgical History:   Procedure Laterality Date    CHOLECYSTECTOMY      COLONOSCOPY      EGD AND COLONOSCOPY  2024    LEG SURGERY Right     plates and screws     Family History   Problem Relation Age of Onset    Cancer Mother     Stomach cancer Sister     Colon polyps Brother     Cancer Brother     Cancer Sister     Colon cancer Neg Hx       reports that she has never smoked. She has never used smokeless tobacco. She reports current alcohol use. She reports that she does not use drugs.  Current Outpatient Medications on File Prior to  Visit   Medication Sig Dispense Refill    cyclobenzaprine (FLEXERIL) 10 mg tablet 10 mg Every 12 hours      diclofenac potassium (CATAFLAM) 50 mg tablet 50 mg      Diclofenac Potassium,Migraine, 50 MG PACK       Euthyrox 150 MCG tablet Take 150 mcg by mouth daily      Fexofenadine HCl (ALLEGRA PO) Take 1 tablet by mouth if needed      losartan (COZAAR) 100 MG tablet every 24 hours      losartan-hydrochlorothiazide (HYZAAR) 100-12.5 MG per tablet Take 1 tablet by mouth in the morning      ondansetron (Zofran ODT) 8 mg disintegrating tablet Take 1 tablet (8 mg total) by mouth every 12 (twelve) hours as needed for nausea or vomiting 30 tablet 1    solifenacin (VESICARE) 10 MG tablet       Wheat Dextrin (BENEFIBER PO) Take by mouth      [DISCONTINUED] famotidine (PEPCID) 20 mg tablet Take 1 tablet (20 mg total) by mouth daily 100 tablet 1     No current facility-administered medications on file prior to visit.   No Known Allergies   Current Outpatient Medications on File Prior to Visit   Medication Sig Dispense Refill    cyclobenzaprine (FLEXERIL) 10 mg tablet 10 mg Every 12 hours      diclofenac potassium (CATAFLAM) 50 mg tablet 50 mg      Diclofenac Potassium,Migraine, 50 MG PACK       Euthyrox 150 MCG tablet Take 150 mcg by mouth daily      Fexofenadine HCl (ALLEGRA PO) Take 1 tablet by mouth if needed      losartan (COZAAR) 100 MG tablet every 24 hours      losartan-hydrochlorothiazide (HYZAAR) 100-12.5 MG per tablet Take 1 tablet by mouth in the morning      ondansetron (Zofran ODT) 8 mg disintegrating tablet Take 1 tablet (8 mg total) by mouth every 12 (twelve) hours as needed for nausea or vomiting 30 tablet 1    solifenacin (VESICARE) 10 MG tablet       Wheat Dextrin (BENEFIBER PO) Take by mouth      [DISCONTINUED] famotidine (PEPCID) 20 mg tablet Take 1 tablet (20 mg total) by mouth daily 100 tablet 1     No current facility-administered medications on file prior to visit.         Objective   /70   Ht 5'  "4\" (1.626 m)   Wt 104 kg (228 lb 9.6 oz)   BMI 39.24 kg/m²      Physical Exam  Vitals and nursing note reviewed.   Constitutional:       General: She is not in acute distress.     Appearance: She is well-developed.   HENT:      Head: Normocephalic and atraumatic.   Eyes:      Conjunctiva/sclera: Conjunctivae normal.   Cardiovascular:      Rate and Rhythm: Normal rate and regular rhythm.      Heart sounds: No murmur heard.  Pulmonary:      Effort: Pulmonary effort is normal. No respiratory distress.      Breath sounds: Normal breath sounds.   Abdominal:      Palpations: Abdomen is soft.      Tenderness: There is no abdominal tenderness.   Musculoskeletal:         General: No swelling.      Cervical back: Neck supple.   Skin:     General: Skin is warm and dry.      Capillary Refill: Capillary refill takes less than 2 seconds.   Neurological:      Mental Status: She is alert.   Psychiatric:         Mood and Affect: Mood normal.           "

## 2025-07-18 DIAGNOSIS — K21.00 GASTROESOPHAGEAL REFLUX DISEASE WITH ESOPHAGITIS WITHOUT HEMORRHAGE: ICD-10-CM

## 2025-07-18 RX ORDER — FAMOTIDINE 20 MG/1
20 TABLET, FILM COATED ORAL 2 TIMES DAILY
Qty: 60 TABLET | Refills: 5 | Status: SHIPPED | OUTPATIENT
Start: 2025-07-18

## 2025-08-08 ENCOUNTER — HOSPITAL ENCOUNTER (OUTPATIENT)
Age: 65
Discharge: HOME | End: 2025-08-08
Attending: OBSTETRICS & GYNECOLOGY
Payer: COMMERCIAL

## 2025-08-18 ENCOUNTER — HOSPITAL ENCOUNTER (OUTPATIENT)
Age: 65
Discharge: HOME | End: 2025-08-18
Attending: OBSTETRICS & GYNECOLOGY
Payer: COMMERCIAL

## 2025-08-18 VITALS — HEIGHT: 64 IN | WEIGHT: 228 LBS | BODY MASS INDEX: 38.93 KG/M2

## 2025-08-18 DIAGNOSIS — R92.30 DENSE BREAST TISSUE ON MAMMOGRAM, UNSPECIFIED TYPE: ICD-10-CM

## 2025-08-18 PROCEDURE — 76641 ULTRASOUND BREAST COMPLETE: CPT

## 2025-08-21 ENCOUNTER — TELEPHONE (OUTPATIENT)
Age: 65
End: 2025-08-21

## 2025-08-21 DIAGNOSIS — E04.2 NONTOXIC MULTINODULAR GOITER: Primary | ICD-10-CM

## 2025-08-22 ENCOUNTER — OFFICE VISIT (OUTPATIENT)
Dept: ENDOCRINOLOGY | Facility: HOSPITAL | Age: 65
End: 2025-08-22
Payer: COMMERCIAL

## 2025-08-22 VITALS
DIASTOLIC BLOOD PRESSURE: 88 MMHG | OXYGEN SATURATION: 100 % | SYSTOLIC BLOOD PRESSURE: 132 MMHG | HEIGHT: 64 IN | WEIGHT: 228 LBS | HEART RATE: 69 BPM | BODY MASS INDEX: 38.93 KG/M2

## 2025-08-22 DIAGNOSIS — E04.2 NONTOXIC MULTINODULAR GOITER: ICD-10-CM

## 2025-08-22 DIAGNOSIS — E06.3 HYPOTHYROIDISM DUE TO HASHIMOTO THYROIDITIS: Primary | ICD-10-CM

## 2025-08-22 PROCEDURE — 99204 OFFICE O/P NEW MOD 45 MIN: CPT | Performed by: STUDENT IN AN ORGANIZED HEALTH CARE EDUCATION/TRAINING PROGRAM

## 2025-08-22 RX ORDER — CELECOXIB 100 MG/1
100 CAPSULE ORAL 2 TIMES DAILY PRN
COMMUNITY

## 2025-08-26 PROBLEM — N31.8 OTHER NEUROMUSCULAR DYSFUNCTION OF BLADDER: Status: ACTIVE | Noted: 2025-08-26

## 2025-08-26 PROBLEM — M17.9 OSTEOARTHRITIS OF KNEE, UNSPECIFIED: Status: ACTIVE | Noted: 2025-08-26

## 2025-08-26 PROBLEM — N39.41 URGE INCONTINENCE OF URINE: Status: ACTIVE | Noted: 2025-08-26

## 2025-08-26 PROBLEM — J30.9 ALLERGIC RHINITIS, UNSPECIFIED: Status: ACTIVE | Noted: 2025-08-26
